# Patient Record
Sex: FEMALE | Race: WHITE | NOT HISPANIC OR LATINO | Employment: UNEMPLOYED | URBAN - METROPOLITAN AREA
[De-identification: names, ages, dates, MRNs, and addresses within clinical notes are randomized per-mention and may not be internally consistent; named-entity substitution may affect disease eponyms.]

---

## 2017-01-30 ENCOUNTER — HISTORICAL (OUTPATIENT)
Dept: INTERNAL MEDICINE | Facility: CLINIC | Age: 42
End: 2017-01-30

## 2017-02-17 ENCOUNTER — HISTORICAL (OUTPATIENT)
Dept: INTERNAL MEDICINE | Facility: CLINIC | Age: 42
End: 2017-02-17

## 2017-09-27 ENCOUNTER — HISTORICAL (OUTPATIENT)
Dept: RADIOLOGY | Facility: HOSPITAL | Age: 42
End: 2017-09-27

## 2018-03-14 ENCOUNTER — HISTORICAL (OUTPATIENT)
Dept: RADIOLOGY | Facility: HOSPITAL | Age: 43
End: 2018-03-14

## 2018-08-28 ENCOUNTER — HISTORICAL (OUTPATIENT)
Dept: INTERNAL MEDICINE | Facility: CLINIC | Age: 43
End: 2018-08-28

## 2018-09-06 ENCOUNTER — HISTORICAL (OUTPATIENT)
Dept: CARDIOLOGY | Facility: HOSPITAL | Age: 43
End: 2018-09-06

## 2018-11-05 ENCOUNTER — HISTORICAL (OUTPATIENT)
Dept: ADMINISTRATIVE | Facility: HOSPITAL | Age: 43
End: 2018-11-05

## 2018-11-09 ENCOUNTER — HISTORICAL (OUTPATIENT)
Dept: INTERNAL MEDICINE | Facility: CLINIC | Age: 43
End: 2018-11-09

## 2018-11-12 LAB — FINAL CULTURE: NORMAL

## 2018-12-14 ENCOUNTER — HISTORICAL (OUTPATIENT)
Dept: ADMINISTRATIVE | Facility: HOSPITAL | Age: 43
End: 2018-12-14

## 2019-02-28 ENCOUNTER — HISTORICAL (OUTPATIENT)
Dept: INTERNAL MEDICINE | Facility: CLINIC | Age: 44
End: 2019-02-28

## 2019-05-09 ENCOUNTER — HISTORICAL (OUTPATIENT)
Dept: ADMINISTRATIVE | Facility: HOSPITAL | Age: 44
End: 2019-05-09

## 2019-05-23 ENCOUNTER — HISTORICAL (OUTPATIENT)
Dept: SURGERY | Facility: HOSPITAL | Age: 44
End: 2019-05-23

## 2019-06-25 ENCOUNTER — HISTORICAL (OUTPATIENT)
Dept: ADMINISTRATIVE | Facility: HOSPITAL | Age: 44
End: 2019-06-25

## 2019-07-01 ENCOUNTER — HISTORICAL (OUTPATIENT)
Dept: RADIOLOGY | Facility: HOSPITAL | Age: 44
End: 2019-07-01

## 2019-07-24 ENCOUNTER — HISTORICAL (OUTPATIENT)
Dept: INTERNAL MEDICINE | Facility: CLINIC | Age: 44
End: 2019-07-24

## 2019-07-30 ENCOUNTER — HISTORICAL (OUTPATIENT)
Dept: SURGERY | Facility: HOSPITAL | Age: 44
End: 2019-07-30

## 2019-10-09 ENCOUNTER — HISTORICAL (OUTPATIENT)
Dept: ADMINISTRATIVE | Facility: HOSPITAL | Age: 44
End: 2019-10-09

## 2019-10-15 ENCOUNTER — HISTORICAL (OUTPATIENT)
Dept: SURGERY | Facility: HOSPITAL | Age: 44
End: 2019-10-15

## 2020-11-30 ENCOUNTER — HISTORICAL (OUTPATIENT)
Dept: ADMINISTRATIVE | Facility: HOSPITAL | Age: 45
End: 2020-11-30

## 2021-01-29 ENCOUNTER — HISTORICAL (OUTPATIENT)
Dept: RADIOLOGY | Facility: HOSPITAL | Age: 46
End: 2021-01-29

## 2021-02-11 ENCOUNTER — HISTORICAL (OUTPATIENT)
Dept: RADIOLOGY | Facility: HOSPITAL | Age: 46
End: 2021-02-11

## 2021-11-11 ENCOUNTER — HISTORICAL (OUTPATIENT)
Dept: ADMINISTRATIVE | Facility: HOSPITAL | Age: 46
End: 2021-11-11

## 2021-11-13 LAB — FINAL CULTURE: NO GROWTH

## 2022-04-07 ENCOUNTER — HISTORICAL (OUTPATIENT)
Dept: ADMINISTRATIVE | Facility: HOSPITAL | Age: 47
End: 2022-04-07
Payer: MEDICAID

## 2022-04-24 VITALS
SYSTOLIC BLOOD PRESSURE: 103 MMHG | HEIGHT: 60 IN | WEIGHT: 114.63 LBS | DIASTOLIC BLOOD PRESSURE: 67 MMHG | BODY MASS INDEX: 22.51 KG/M2 | OXYGEN SATURATION: 100 %

## 2022-04-30 NOTE — H&P
"   Patient:   Claudia Hurtado            MRN: [id number]            FIN: [id number]               Age:   44 years     Sex:  Female     :  1975   Associated Diagnoses:   None   Author:   Surjit Reese MD      No changes as below      Surjit Reese MD      Chief Complaint   referral from NP for bilat. CTS for surgical evaluation History of Present Illness 44 yo female non-smoker presents to ortho clinic for follow up visit for bilateral hand pain. Patient points to diffuse hand numbness. Patient dominate hand: right R>L    EMG dated 16 reviewed:  Abnormal study. Nerve conduction study of bilateral upper extremities show findings of moderate bilateral carpal tunnel syndrome. There was no findings of ulnar entrapment neuropathy at the elbows bilaterally.    Today: Re-evaluation for conservative treatment for bilateral CTS; symptoms continue despite conservative treatment and would like to be considered for carpal tunnel release. She admits wearing the splint at night helps with the pain. Ibuprofen does not help with the pain. States she had CSI without improvement.    Onset: Insidious over years progressively worsening (8 years)  Current pain level: 9/10 without pain medication. Quality described as sharp . Patient denies use of pain medication today.   Medications r/t complaint: Patient reports using RX / OTC medications in past including: Tramadol, ibuprofen 800 mg RX per PCP/ED. Currently taking same PRN pain with mild relief.   Modifying Factors: Worse with activity;Improved with rest; No stiffness   Injury: Denies  Previous treatment: HEP RX 18 and reports does them "sometimes"; RX NSAIDs, PT RX 18 and reports she has not attended , CSI 18 without symptom relief; nocturnal splinting RX per PCP patient reports using PRN.  Associated Symptoms: No Crepitus/Grinding; Numbness/ tingling; No swelling; No skin changes; Weakness of R>L hand; Moderate decrease in ROM; difficulty " sleeping at night s/t pain  Activity: Sedentary, full ADLs; Pain interferes with function/daily activity (mild)  Family History: Family history of arthritis  PMH: denies CVD; denies DM ; denies RA; denies gout; denies RX anticoagulants; denies intolerance to NSAIDs s/t GI issues; denies intolerance to NSAIDs s/t kidney disease  PCP: Monet Boles NP [facility] , referral source same  Employment: Patient was a  for 30 years. Quit 1 year ago. Currently work at the [employer].  Note: Patient in clinic 12/14/18 with frustration over wait to get into clinic, discussed referral and tried to improve rapport to improve patient outcome and satisfaction with treatment. Review of Systems Constitutional:No fever;No chills;No weight loss  CV:No swelling;No edema  GI:No urinary retention;No urinary incontinence  :No fecal incontinence  Skin:No rash;No wound  Neuro:No numbness/tingling;No weakness;No saddle anesthesia  MSK: As above  Psych:No depression;No anxiety  Heme/Lymph:No easy bruising;No easy bleeding;No lymphadenopathy  Immuno:No MRSA history Physical Exam Vitals & Measurements HT: 154 cm WT: 51.5 kg BMI: 21.72 MSK:Bilateral HAND/ WRIST  General: In mild distress; patient is crying saying she is in pain  Inspection: No masses, no deformity, no skin discoloration, no joint swelling, no contracture, no presences of cysts/nodules. No muscle atrophy to the thenar eminence or intrinsic muscles of the first web space. Currently wearing a wrist splint  Palpation: tender to palpate diffuse right wrist; No swelling;No bony enlargement  ROM:   Open/closure of hand: Inability to flex or extend and/or malalignment with motion  Strength:    strength: Reduced R>L  Special Tests:  Trigger finger presence: Negative  Phalen test:Positive R>L  Tinel test: Negative   Eliu carpal compression:Positive R>L  Finkelstein test: Negative  Neurovascular: Intact; 2+ distal pulse, sensation intact to light touch  Neuro/Psych:  Awake, Alert, Oriented; Normal mood and affect  Lymphatic: No LAD  Skin and Soft Tissue: No bruising, No ecchymosis; No rash; Skin intact Assessment/Plan 1. Bilateral carpal tunnel syndrome G56.03 Discussed with patient diagnosis and treatment recommendations. Handout given.  Imaging:EMG reviewed and discussed with patient.  Treatment plan: consult orthopedic surgery for evaluation. Discussed CSI with patient. Patient has failed with CSI and would not like another CSI. States she would like to be consider for CTS surgery instead. Discuss with Dr. Reese, orthopedic resident and agrees to proceed with right CTS surgery. Patient scheduled for RIGHT CTS surgery on 05/16/2019 with Dr Reese and   Procedure: none  Activity: Activity as tolerated; activity modifications as necessary  Therapy:Nocturnal splinting of bilateral_ wrist in neutral position   Medication: continue medications as RX per PCP; continue medications as RX per PCP  RTC: as needed  Additional work-up:

## 2022-04-30 NOTE — OP NOTE
Patient:   Claudia Hurtado            MRN: [id number]            FIN: [id number]               Age:   44 years     Sex:  Female     :  1975   Associated Diagnoses:   None   Author:   Surjit Reese MD      DOS: 19    Surgery: R carpal tunnel release    Preop Dx: R carpal tunnel syndrome    Postop Dx: same as preop    Surgeon: Dr. Reese    Attending: Dr. Flowers    EBL: minimal    Complications: none    Implants: none    Technique:    The patient is a 44F with carpal. tunnel syndrome.  R/B of surgery were discussed, patient signed informed consent.  Seen in preop and site was marked.  Taken to OR and given periop abx as appropriate.  Tolerate anesthesia induction well.  RUE prepped and draped in the usual sterile fashion.  Timeout performed.  10cc of 1% lidocaine plain used to perform a field block.  Limb exsanguinated and tourniquet inflated to 250mmHg.      A 15 blade was used to perform a 2cm incision longitudinally in line with the radial aspect of the 4th ray.  Dissection was carried through subcutaneous fat, palmar aponeurosis identifying the TCL.  The transverse carpal ligament was incised witha  15 blade and fully released with metzenbaum scissors.  A freer was used to confirm full release.  The wound was thoroughly irrigated and closed with 3-0 nylon.  A soft dressing was placed and the patient was awakened from anesthesia/tourniquet let down with the patient having suffered no untoward events from the procedure.     Postop Plan    Sutures out at 2 weeks      Surjit Reese MD

## 2022-04-30 NOTE — OP NOTE
Patient:   Claudia Hurtado            MRN: [id number]            FIN: [id number]               Age:   44 years     Sex:  Female     :  1975   Associated Diagnoses:   None   Author:   Paulo Bacon MD      Operative Note   Operative Information   Date/ Time:  10/15/2019 09:20:00.     Procedures Performed: Left carpal tunnel release.     Indications: Patient is a 44-year-old female with severe left carpal tunnel syndrome confirmed on EMG.  Patient indicated for open carpal tunnel release after having failed nonoperative and conservative treatments..     Preoperative Diagnosis: Left carpal tunnel syndrome.     Postoperative Diagnosis: Left carpal tunnel syndrome.     Surgeon: Kendall Crockett MD.     Assistant: Paulo Bacon MD.     Anesthesia: MAC with local.     Esimated blood loss: loss  5  cc.     Findings: Severely thickened transverse carpal ligament.     Complications: None.     Notes: Patient was seen and examined in the preoperative holding area.  The correct site was marked history and physical updated consents verified patient was taken to the operating room placed in supine position left upper humerus prepped in the usual sterile fashion after MAC anesthesia was undertaken.  Before draping, a 10 mL block with half percent lidocaine and 1% lidocaine was injected into the operative site and into the carpal tunnel.  A preoperative timeout was held with all members of surgical team.  A nonsterile tourniquet was previously placed as well.  Preoperative antibiotics were administered.  The operative extremity was exsanguinated and the tourniquet was taken up.  A longitudinal standard carpal tunnel incision was made.  Sharp dissection was taken down to the palmar fascia which was incised.  The transverse carpal ligament was identified and cleared soft tissue.  The ligament was then incised with a knife.  Scissors were used to fully release the tendon both proximally and distally.  Happy with the  release, the wound was then thoroughly irrigated.  The skin was then closed with interrupted Prolene stitches.  A soft dressing was placed.  The tourniquet was taken down.  All needle and sponge counts were correct.  Concluded the operation.  The patient was awakened and taken to the recovery room having suffered no untoward event.    Postop plan  Stitches of 2 weeks  Nonweightbearing for 2 weeks  5 pound weight limit restriction for 6.

## 2022-04-30 NOTE — H&P
Patient:   Claudia Hurtado            MRN: [id number]            FIN: [id number]               Age:   44 years     Sex:  Female     :  1975   Associated Diagnoses:   None   Author:   Paulo Bacon MD      Chief Complaint   right CTR Right thumb pain   History of Present Illness Claudia Hurtado is a RHD 44 Years Female presenting as a established patient to [facility] for follow-up status post right carpal tunnel release. Patient reports that initially after surgery she was symptom-free in approximately 1 month ago she began having a burning sensation on the thenar aspect of her right thumb. She denies any trauma to the area. She denies any radiation of the pain. She reports that the burning sensation is worse when she is using her thumb. She has has not tried anything to help relieve her pain at this point. Otherwise, her remaining numbness and tingling is still gone from her right hand.    Patient also presenting with worsening carpal tunnel syndromes in her left hand. Patient does have an EMG demonstrating moderate carpal tunnel syndrome on the left. She has attempted night splints and other conservative treatment which now has failed. She is reporting numbness and tingling in her in her long middle and ring finger. She also reports that she is dropping things and noticing decreased  strength in her hand. She reports the pain is worse at night.    Review of Systems negative except as noted in HPI Physical Exam   Vitals & Measurements HT: 153 cm WT: 49 kg BMI: 20.93 GEN: NAD, cooperative with normal mood  CV: RRR by palpation  RESP: normal work of breathing  RUE:  previous surgical incision healed  negative grind  IP joint stable to varus/valgus stress  SILT M/U/R  +ain/pin/ulnar  2+ RP    LUE:  no atrophy of thenar or hypothenar hand muscles  +ain/pin/ulnar  SILT M/U/R with decreased 2 point discrimination at ring, long, and index finger  2+ RP    Assessment/Plan 1. Carpal tunnel  syndrome on left G56.02 With regard to the patient's symptoms on her right hand, I encouraged her to attempt conservative treatment. She will wear her night splint at night and additionally can take Neurontin at night to help with the nerve symptoms.    With regard to her left carpal tunnel syndrome, she has failed conservative treatment. Discussed with her the benefits and risks of carpal tunnel release. Specifically discussed with her that the goal is to help prevent any further atrophy of her muscles. Patient understands that she might still have some sensation of tingling at her distal fingertips. Sx for left carpal tunnel release on October 15 Medications Claritin 10 mg oral tablet, 10 mg= 1 tab(s), Oral, Daily Flonase 50 mcg/inh nasal spray, 2 spray(s), Nasal, BID omeprazole 20 mg oral DR capsule, 20 mg= 1 cap(s), Oral, Daily, 11 refills   Diagnostic Results EMG demonstrates moderate left carpal tunnel syndrome with no entrapment of ulnar nerve            with  at bedside, pt alert and awake x3, states she started to have chest pain since 0900, denies any cardiac hx, tender upon palpation, LS clear, resp even and unlabored bilat, abd soft, denies n/v/d, denies fever/chills, will continue to monitor.

## 2022-05-01 NOTE — HISTORICAL [facility] CERNER
This is a historical note converted from [employer]. Formatting and pictures may have been removed.  Please reference [employer] for original formatting and attached multimedia. Indication for Surgery  44-year-old female with bilateral chronic serous effusions, and type B temps  Preoperative Diagnosis  Bilateral chronic serous effusions  Postoperative Diagnosis  Same  Operation  1. ?Bilateral myringotomy and tubes  Surgeon(s)  Rylie Charlton MD  Anesthesia  General Mask Anesthesia  Estimated Blood Loss  0 cc  Findings  Bilateral thick mucoid effusions  Specimen(s)  None  Complications  None  Technique  Risks, benefits, and alternatives of the procedure were explained to the patient and consent was obtained. The patient was then taken to the operating room and placed on the table in supine position. General mask anesthesia was induced and the patient was positioned.  ?   The right ear was examined first using the operating microscope. A speculum was placed into the ear canal which was cleaned using a curette and suction. A myringotomy knife was then used to make an?incision in the anterior-inferior portion of the tympanic membrane. ?Thick mucoserous?effusion was suctioned from behind the drum. An Ching tube was placed through the incision.? Ofloxacin drops were the placed and the speculum was removed. The patients head was turned and the same procedure was repeated on the left side with thick mucoserous?effusion present and suctioned.  ?   The procedure was then ended and the patient was returned to anesthesia to be awakened. The was awaked and taken to the recovery room in stable condition.  ?   Dr. James supervised the entire procedure.  ?  Rylie Charlton MD  [employer] [facility] PGY2   I was present for the key portions of the surgery and actively participated in the patient?s care. ?I agree with the operative note as dictated by the resident.  ?
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
## 2022-05-02 NOTE — HISTORICAL [facility] CERNER
This is a historical note converted from Certaty. Formatting and pictures may have been removed.  Please reference Certaty for original formatting and attached multimedia. Chief Complaint  Annual  History of Present Illness  Pt is  here for annual GYN exam. Pt reports a hx of breast cancer with lumpectomy and chemo/radiation at age 25. Pt also reports colon cancer with surgery at 28 and 33. Reports?skin cancer. Pt has had a hysterectomy with BSO in  in [place], d/t ovarian cancer.?Last vag pap-2019-NIL and HPV neg. MG-21-BIRADS 1. Denies any breast complaints.?Pt reports?urinary?frequency and frequent UTIs.?States?I dont have symptoms when I have a UTI.?Admits to not drinking much water and high coffee and soda usage. She was referred to uro/gyn last year however not seen. Denies pelvic pain.?Treated for trichomonas last year, she is interested in STD screening today. Positive tobacco use. Admits to breast cancer in mother @ 52 and ovarian cancer in 2 sisters.?Pt completed genetic testing in 2017?showing only?variant of uncertain significance (VUS): HEIKE p.[id number]. Pt is followed by IM for primary?care.  Review of Systems  CONSTITUTIONAL:??No weight loss, fever, chills or fatigue.  BREAST: No lumps or masses or pain, no nipple discharge or inversion  GI:??No nausea, vomiting or?abdominal pain.  :??No dysuria or urgency.?No hematuria. c/o frequency  GYN: No abnormal discharge, itching, bleeding, pelvic pain.  NEUROLOGIC:??Alert and oriented, No confusion.  Physical Exam  Vitals & Measurements  T:?36.3? ?C (Oral)? HR:?66(Peripheral)? RR:?16? BP:?115/70? SpO2:?100%?  HT:?152.00?cm? WT:?52.000?kg? BMI:?22.51?  GENERAL: Alert and oriented x3. No apparent distress.  BREAST: No mass, tenderness or discharge.?  ABDOMEN: Soft, nontender.?  PELVIC:?  Labia: No erythema or lesions.  Vagina: atrophic, Vaginal cuff intact.  Cervix:?Surgically absent.  Uterus: Surgically absent.  Adnexa: Non-tender, no fullness  noted. Ovaries surgically absent.  INTEGUMENTARY: Warm and dry.  NEUROLOGIC: She is alert and oriented x3.?  PSYCHIATRIC: Cooperative, appropriate mood and affect.  Assessment/Plan  1.?Encounter for routine pelvic examination?Z01.419  ?Pelvic today. Pap deferred d/t hysterectomy  RTC 1 year.  Ordered:  1160F- Medication reconciliation completed during visit, Encounter for routine pelvic examination  Urinary frequency  Routine screening for STI (sexually transmitted infection)  Tobacco user, 11/11/21 10:11:00 CST  Clinic Follow up, *Est. 11/11/22 3:00:00 CST, Order for future visit, Encounter for routine pelvic examination, [facility]  Office/Outpatient Visit Level 4 Established [id number] PC, Encounter for routine pelvic examination  Routine screening for STI (sexually transmitted infection)  Urinary frequency  Tobacco user, 11/11/21 10:12:00 CST  ?  2.?Routine screening for STI (sexually transmitted infection)?Z11.3  ?wet prep and g/c  hiv, hep and syphilis  Ordered:  1160F- Medication reconciliation completed during visit, Encounter for routine pelvic examination  Urinary frequency  Routine screening for STI (sexually transmitted infection)  Tobacco user, 11/11/21 10:11:00 CST  Chlam trachom & N gonorrhoeae by LYDIA-[facility] [id number], Routine collect, Urine, Order for future visit, Collected, 11/11/21 10:11:00 CST, Stop date 11/11/21 10:11:00 CST, Nurse collect, Routine screening for STI (sexually transmitted infection), 11/11/21 10:11:00 CST  Hepatitis Panel-, Routine collect, 11/11/21 10:11:00 CST, Blood, Order for future visit, Stop date 11/11/21 10:11:00 CST, Lab Collect, Routine screening for STI (sexually transmitted infection), 11/11/21 10:11:00 CST  HIV 1 and 2, Routine collect, 11/11/21 10:11:00 CST, Blood, Order for future visit, Stop date 11/11/21 10:11:00 CST, Lab Collect, Routine screening for STI (sexually transmitted infection), 11/11/21 10:11:00 CST  Office/Outpatient Visit Level 4  Established [id number] PC, Encounter for routine pelvic examination  Routine screening for STI (sexually transmitted infection)  Urinary frequency  Tobacco user, 11/11/21 10:12:00 CST  Syphilis Antibody (with Reflex RPR), Routine collect, 11/11/21 10:11:00 CST, Blood, Order for future visit, Stop date 11/11/21 10:11:00 CST, Lab Collect, Routine screening for STI (sexually transmitted infection), 11/11/21 10:11:00 CST  Wet Prep Smear, Routine collect, Vaginal, Order for future visit, 11/11/21 10:11:00 CST, Stop date 11/11/21 10:11:00 CST, Nurse collect, Routine screening for STI (sexually transmitted infection), 11/11/21 10:11:00 CST  ?  3.?Urinary frequency?R35.0  ?c/o frequency and frequent UTIs,  Admit tobacco?and high caffeine use, encouraged decrease use and increase water intake, U/A and C&S sent.  Discussed risk factors of incontinence-age, obesity and smoking. Encouraged lifestyle modifications such as dec alcoholic, carbonated and caffeinated drinks and smoking cessation. Educated on and encouraged pt to perform kegel exercises starting at 10 contractions and 10 relaxations 4 x/day. Urology consult.  Ordered:  1160F- Medication reconciliation completed during visit, Encounter for routine pelvic examination  Urinary frequency  Routine screening for STI (sexually transmitted infection)  Tobacco user, 11/11/21 10:11:00 CST  Office/Outpatient Visit Level 4 Established [id number] PC, Encounter for routine pelvic examination  Routine screening for STI (sexually transmitted infection)  Urinary frequency  Tobacco user, 11/11/21 10:12:00 CST  Urinalysis with Microscopic if Indicated, Routine collect, Urine, Order for future visit, 11/11/21 10:11:00 CST, Stop date 11/11/21 10:11:00 CST, Nurse collect, Urinary frequency  Urine Culture [id number], Routine collect, 11/11/21 10:11:00 CST, Order for future visit, Urine, Clean Catch, Nurse collect, Stop date 11/11/21 10:11:00 CST, Urinary frequency  Urine Dipstick POC, 11/11/21  10:11:00 CST, Stop date 11/11/21 10:11:00 CST, 11/11/21 10:11:00 CST  ?  4.?Tobacco user?Z72.0  ?Smoking cessation counseling provided.?Instructed on smoking cessation program through [facility] and pharmacological interventions to aid in cessation.  Ordered:  1160F- Medication reconciliation completed during visit, Encounter for routine pelvic examination  Urinary frequency  Routine screening for STI (sexually transmitted infection)  Tobacco user, 11/11/21 10:11:00 CST  Office/Outpatient Visit Level 4 Established [id number] PC, Encounter for routine pelvic examination  Routine screening for STI (sexually transmitted infection)  Urinary frequency  Tobacco user, 11/11/21 10:12:00 CST  ?  Referrals  Clinic Follow up, *Est. 11/11/22 3:00:00 CST, Order for future visit, Encounter for routine pelvic examination, [facility]   Problem List/Past Medical History  Ongoing  Bilateral carpal tunnel syndrome  Breast cancer  Cancer of colon  Cancer of skin  Chronic otitis media  Constipation  Hematoma  Laceration of right knee  Ovarian cancer  Tobacco user  Tobacco user  Historical  Cancer  Pregnant  Pregnant  Pregnant  Pregnant  Procedure/Surgical History  Carpal Tunnel Release (None) (10/15/2019)  Neuroplasty and/or transposition; median nerve at carpal tunnel (10/15/2019)  Release Median Nerve, Open Approach (10/15/2019)  Drainage of Left Middle Ear with Drainage Device, Via Natural or Artificial Opening (07/30/2019)  Drainage of Right Middle Ear with Drainage Device, Via Natural or Artificial Opening (07/30/2019)  Myringotomy With Tubes (None) (07/30/2019)  Tympanostomy (requiring insertion of ventilating tube), general anesthesia (07/30/2019)  Carpal Tunnel Release (None) (05/23/2019)  Neuroplasty and/or transposition; median nerve at carpal tunnel (05/23/2019)  Release Median Nerve, Open Approach (05/23/2019)  Repair Right Lower Leg Subcutaneous Tissue and Fascia, Open Approach (07/21/2017)  Simple repair of superficial  wounds of scalp, neck, axillae, external genitalia, trunk and/or extremities (including hands and feet); 7.6 cm to 12.5 cm (07/21/2017)  Drainage of Bladder with Drainage Device, Via Natural or Artificial Opening (01/30/2017)  Insertion of temporary indwelling bladder catheter; simple (eg, Watkins) (01/30/2017)  Colonoscopy, flexible; diagnostic, including collection of specimen(s) by brushing or washing, when performed (separate procedure) (08/03/2016)  Esophagogastroduodenoscopy, flexible, transoral; diagnostic, including collection of specimen(s) by brushing or washing, when performed (separate procedure) (08/03/2016)  Inspection of Lower Intestinal Tract, Via Natural or Artificial Opening Endoscopic (08/03/2016)  Inspection of Upper Intestinal Tract, Via Natural or Artificial Opening Endoscopic (08/03/2016)  hetoma removal (2011)  Hysterectomy (01/01/2001)  breast tumor removal  knee surgey  Tonsillectomy and adenoidectomy; age 12 or over   Medications  albuterol CFC free 90 mcg/inh inhalation aerosol with adapter, 2 puff(s), Oral, q4hr  amoxicillin-clavulanate 875 mg-125 mg oral tablet, 1 tab(s), Oral, BID  fluticasone 50 mcg/inh nasal spray, 2 spray(s), Both Nostrils, Daily  hydrocortisone/neomycin/polymyxin B 1%-0.35%-10,000 units/mL otic solution, 2 drop(s), Ear-Left, QID, 1 refills  naproxen 500 mg oral tablet, 500 mg= 1 tab(s), Oral, BID  prednisONE 20 mg oral tablet, 40 mg= 2 tab(s), Oral, Daily  Allergies  No Known Allergies  Social History  Abuse/Neglect  No, No, Yes, 01/19/2021  No, No, Yes, 01/14/2021  Alcohol  Current, Beer, 1-2 times per year, Alcohol use interferes with work or home: No. Others hurt by drinking: No. Household alcohol concerns: No., 01/19/2021  Current, Liquor, 1-2 times per year, 12/30/2020  Employment/School  Employed, Work/School description: ., 10/19/2016  Exercise  Exercise frequency: 3-4 times/week., 09/17/2015  Home/Environment  Lives with Significant other.,  08/08/2017    Never in , 01/19/2021  Nutrition/Health  Regular, Wants to lose weight: No. Sleeping concerns: Yes. Feels highly stressed: Yes., 09/17/2015  Sexual  Sexually active: Yes., 11/10/2020  Spiritual/Cultural  [employer]ehov[employer], 01/19/2021  Substance Use  Never, 01/19/2021  Past, 12/30/2020  Tobacco  5-9 cigarettes (between 1/4 to 1/2 pack)/day in last 30 days, Cigarettes, No, 01/19/2021  5-9 cigarettes (between 1/4 to 1/2 pack)/day in last 30 days, Cigarettes, No, 01/14/2021  Family History  Breast cancer: Mother.  Cancer: Brother.  Depression.: Sister and Brother.  Diabetes: Father.  Hypertension.: Father.  Ovarian cancer: Sister.  Immunizations  Vaccine Date Status   influenza virus vaccine, inactivated 11/06/2019 Given   influenza virus vaccine, inactivated 11/05/2018 Given   tetanus-diphtheria toxoids 07/21/2017 Given   Health Maintenance  Health Maintenance  ???Pending?(in the next year)  ??? ??OverDue  ??? ? ? ?Smoking Cessation due??01/01/21??and every 1??year(s)  ??? ? ? ?Alcohol Misuse Screening due??01/02/21??and every 1??year(s)  ??? ??Due In Future?  ??? ? ? ?Obesity Screening not due until??01/01/22??and every 1??year(s)  ??? ? ? ?ADL Screening not due until??01/19/22??and every 1??year(s)  ??? ? ? ?Diabetes Screening not due until??05/08/22??and every 3??year(s)  ???Satisfied?(in the past 1 year)  ??? ??Satisfied?  ??? ? ? ?ADL Screening on??01/19/21.??Satisfied by Naomie Rodgers LPN  ??? ? ? ?Blood Pressure Screening on??11/11/21.??Satisfied by Tawana Melissa KENNEDY  ??? ? ? ?Body Mass Index Check on??11/11/21.??Satisfied by Tawana Melissa KENNEDY  ??? ? ? ?Breast Cancer Screening on??01/29/21.??Satisfied by Rachel Spann  ??? ? ? ?Depression Screening on??01/19/21.??Satisfied by Naomie Rodgers LPN  ??? ? ? ?Influenza Vaccine on??11/11/21.??Satisfied by Tawana Melissa KENNEDY  ??? ? ? ?Obesity Screening on??11/11/21.??Satisfied by Tawana Melissa KENNEDY  ?

## 2022-06-14 NOTE — PROGRESS NOTES
Chief Complaint: No chief complaint on file.      HPI: Patient is a 47-year-old female  Two-month follow-up for neurogenic bladder.   Patient placed on oxybutynin ER 5 mg patient's symptoms  Urinary urgency andincontinence continue.  Patient denies  urinary frequency, retention, dysuria, gross hematuria.  Patient very compliant with decreasing coffee intake and sodas is down to 1 coffee a day and 2 sodas a day. Bladder scan 81 ml.    Plan:  Instructed patient to increase oxybutynin ER to 10 mg p.o. daily discontinue oxybutynin 5 mg follow-up in 3  months. the patient the technique of double voiding and timed voiding every 2 -3 hours.Instructed patient on Avoiding bladder irritants, such as alcohol, citrus drinks or foods,salty foods, energy drinks, spicy foods, caffeine, sodas.  Allergies:  Review of patient's allergies indicates:  Not on File    Medications:  No current outpatient medications on file.     No current facility-administered medications for this visit.       Review of Systems:  General: No fever, chills, fatigability, or weight loss.  Skin: No rashes, itching, or changes in color or texture of skin.  Chest: Denies RALPH, cyanosis, wheezing, cough, and sputum production.  Abdomen: Appetite fine. No weight loss. Denies diarrhea, abdominal pain, hematemesis, or blood in stool.  Musculoskeletal: No joint stiffness or swelling. Denies back pain.  : As above.  All other review of systems negative.    PMH:  No past medical history on file.    PSH:  No past surgical history on file.    FamHx:  No family history on file.    SocHx:  Social History     Socioeconomic History    Marital status: Single       Physical Exam:  There were no vitals filed for this visit.  General: A&Ox3, no apparent distress, no deformities  Neck: No masses, normal thyroid  Lungs: CTA ita, no use of accessory muscles  Heart: RRR, no arrhythmias  Abdomen: Soft, NT, ND, no masses, no hernias, no hepatosplenomegaly  Lymphatic: Neck and  groin nodes negative  Skin: The skin is warm and dry. No jaundice.  Ext: No c/c/e.    Labs: none      Imaging: none       Impression:    Neurogenic Bladder, Urinary urgency.    Plan: Instructed patient to increase oxybutynin ER to 10 mg p.o. daily discontinue oxybutynin 5 mg follow-up in 3  months. the patient the technique of double voiding and timed voiding every 2 through hours.Instructed patient on Avoiding bladder irritants, such as alcohol, citrus drinks or foods,salty foods, energy drinks, spicy foods, caffeine, sodas.

## 2022-06-15 ENCOUNTER — OFFICE VISIT (OUTPATIENT)
Dept: UROLOGY | Facility: CLINIC | Age: 47
End: 2022-06-15
Payer: MEDICAID

## 2022-06-15 VITALS
OXYGEN SATURATION: 100 % | SYSTOLIC BLOOD PRESSURE: 111 MMHG | DIASTOLIC BLOOD PRESSURE: 76 MMHG | RESPIRATION RATE: 18 BRPM | TEMPERATURE: 98 F | WEIGHT: 99.38 LBS | HEIGHT: 60 IN | HEART RATE: 63 BPM | BODY MASS INDEX: 19.51 KG/M2

## 2022-06-15 DIAGNOSIS — N39.490 OVERFLOW INCONTINENCE OF URINE: ICD-10-CM

## 2022-06-15 DIAGNOSIS — N31.9 NEUROGENIC BLADDER: ICD-10-CM

## 2022-06-15 PROCEDURE — 1159F PR MEDICATION LIST DOCUMENTED IN MEDICAL RECORD: ICD-10-PCS | Mod: CPTII,,, | Performed by: NURSE PRACTITIONER

## 2022-06-15 PROCEDURE — 1160F PR REVIEW ALL MEDS BY PRESCRIBER/CLIN PHARMACIST DOCUMENTED: ICD-10-PCS | Mod: CPTII,,, | Performed by: NURSE PRACTITIONER

## 2022-06-15 PROCEDURE — 3074F SYST BP LT 130 MM HG: CPT | Mod: CPTII,,, | Performed by: NURSE PRACTITIONER

## 2022-06-15 PROCEDURE — 3078F DIAST BP <80 MM HG: CPT | Mod: CPTII,,, | Performed by: NURSE PRACTITIONER

## 2022-06-15 PROCEDURE — 99214 OFFICE O/P EST MOD 30 MIN: CPT | Mod: PBBFAC | Performed by: NURSE PRACTITIONER

## 2022-06-15 PROCEDURE — 3074F PR MOST RECENT SYSTOLIC BLOOD PRESSURE < 130 MM HG: ICD-10-PCS | Mod: CPTII,,, | Performed by: NURSE PRACTITIONER

## 2022-06-15 PROCEDURE — 99213 PR OFFICE/OUTPT VISIT, EST, LEVL III, 20-29 MIN: ICD-10-PCS | Mod: S$PBB,,, | Performed by: NURSE PRACTITIONER

## 2022-06-15 PROCEDURE — 3008F PR BODY MASS INDEX (BMI) DOCUMENTED: ICD-10-PCS | Mod: CPTII,,, | Performed by: NURSE PRACTITIONER

## 2022-06-15 PROCEDURE — 1160F RVW MEDS BY RX/DR IN RCRD: CPT | Mod: CPTII,,, | Performed by: NURSE PRACTITIONER

## 2022-06-15 PROCEDURE — 1159F MED LIST DOCD IN RCRD: CPT | Mod: CPTII,,, | Performed by: NURSE PRACTITIONER

## 2022-06-15 PROCEDURE — 3008F BODY MASS INDEX DOCD: CPT | Mod: CPTII,,, | Performed by: NURSE PRACTITIONER

## 2022-06-15 PROCEDURE — 99213 OFFICE O/P EST LOW 20 MIN: CPT | Mod: S$PBB,,, | Performed by: NURSE PRACTITIONER

## 2022-06-15 PROCEDURE — 3078F PR MOST RECENT DIASTOLIC BLOOD PRESSURE < 80 MM HG: ICD-10-PCS | Mod: CPTII,,, | Performed by: NURSE PRACTITIONER

## 2022-06-15 RX ORDER — OXYBUTYNIN CHLORIDE 10 MG/1
10 TABLET, EXTENDED RELEASE ORAL DAILY
Qty: 30 TABLET | Refills: 11 | Status: SHIPPED | OUTPATIENT
Start: 2022-06-15 | End: 2023-06-15

## 2022-06-15 RX ORDER — ALBUTEROL SULFATE 90 UG/1
2 AEROSOL, METERED RESPIRATORY (INHALATION) EVERY 4 HOURS PRN
COMMUNITY
Start: 2021-08-05

## 2022-06-15 NOTE — PROGRESS NOTES
Patient seen by MATILDA Ward. Bladder scan performed with 81mL post void residual. Sending prescription to patient pharmacy and RTC 3 months. Discharge instructions given verbal and written.

## 2022-07-11 ENCOUNTER — OFFICE VISIT (OUTPATIENT)
Dept: OTOLARYNGOLOGY | Facility: CLINIC | Age: 47
End: 2022-07-11
Payer: MEDICAID

## 2022-07-11 VITALS
WEIGHT: 108 LBS | HEART RATE: 76 BPM | HEIGHT: 60 IN | BODY MASS INDEX: 21.2 KG/M2 | TEMPERATURE: 98 F | DIASTOLIC BLOOD PRESSURE: 60 MMHG | SYSTOLIC BLOOD PRESSURE: 100 MMHG

## 2022-07-11 DIAGNOSIS — J30.9 ALLERGIC RHINITIS, UNSPECIFIED SEASONALITY, UNSPECIFIED TRIGGER: ICD-10-CM

## 2022-07-11 DIAGNOSIS — H91.90 HEARING LOSS, UNSPECIFIED HEARING LOSS TYPE, UNSPECIFIED LATERALITY: Primary | ICD-10-CM

## 2022-07-11 DIAGNOSIS — M79.18 MYOFASCIAL PAIN: ICD-10-CM

## 2022-07-11 DIAGNOSIS — M26.623 BILATERAL TEMPOROMANDIBULAR JOINT PAIN: ICD-10-CM

## 2022-07-11 PROCEDURE — 3074F PR MOST RECENT SYSTOLIC BLOOD PRESSURE < 130 MM HG: ICD-10-PCS | Mod: CPTII,,, | Performed by: NURSE PRACTITIONER

## 2022-07-11 PROCEDURE — 3008F BODY MASS INDEX DOCD: CPT | Mod: CPTII,,, | Performed by: NURSE PRACTITIONER

## 2022-07-11 PROCEDURE — 3078F DIAST BP <80 MM HG: CPT | Mod: CPTII,,, | Performed by: NURSE PRACTITIONER

## 2022-07-11 PROCEDURE — 1159F MED LIST DOCD IN RCRD: CPT | Mod: CPTII,,, | Performed by: NURSE PRACTITIONER

## 2022-07-11 PROCEDURE — 3074F SYST BP LT 130 MM HG: CPT | Mod: CPTII,,, | Performed by: NURSE PRACTITIONER

## 2022-07-11 PROCEDURE — 99214 OFFICE O/P EST MOD 30 MIN: CPT | Mod: PBBFAC | Performed by: NURSE PRACTITIONER

## 2022-07-11 PROCEDURE — 99214 OFFICE O/P EST MOD 30 MIN: CPT | Mod: S$PBB,,, | Performed by: NURSE PRACTITIONER

## 2022-07-11 PROCEDURE — 99214 PR OFFICE/OUTPT VISIT, EST, LEVL IV, 30-39 MIN: ICD-10-PCS | Mod: S$PBB,,, | Performed by: NURSE PRACTITIONER

## 2022-07-11 PROCEDURE — 3008F PR BODY MASS INDEX (BMI) DOCUMENTED: ICD-10-PCS | Mod: CPTII,,, | Performed by: NURSE PRACTITIONER

## 2022-07-11 PROCEDURE — 1159F PR MEDICATION LIST DOCUMENTED IN MEDICAL RECORD: ICD-10-PCS | Mod: CPTII,,, | Performed by: NURSE PRACTITIONER

## 2022-07-11 PROCEDURE — 3078F PR MOST RECENT DIASTOLIC BLOOD PRESSURE < 80 MM HG: ICD-10-PCS | Mod: CPTII,,, | Performed by: NURSE PRACTITIONER

## 2022-07-11 RX ORDER — TRIAMCINOLONE ACETONIDE 55 UG/1
2 SPRAY, METERED NASAL DAILY
Qty: 16.9 ML | Refills: 6 | Status: SHIPPED | OUTPATIENT
Start: 2022-07-11

## 2022-07-11 RX ORDER — LEVOCETIRIZINE DIHYDROCHLORIDE 5 MG/1
5 TABLET, FILM COATED ORAL NIGHTLY
Qty: 30 TABLET | Refills: 11 | Status: SHIPPED | OUTPATIENT
Start: 2022-07-11 | End: 2023-07-11
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
## 2022-08-22 ENCOUNTER — OFFICE VISIT (OUTPATIENT)
Dept: OTOLARYNGOLOGY | Facility: CLINIC | Age: 47
End: 2022-08-22
Payer: MEDICAID

## 2022-08-22 VITALS
SYSTOLIC BLOOD PRESSURE: 129 MMHG | RESPIRATION RATE: 16 BRPM | TEMPERATURE: 98 F | DIASTOLIC BLOOD PRESSURE: 83 MMHG | HEART RATE: 64 BPM | WEIGHT: 104 LBS | HEIGHT: 60 IN | BODY MASS INDEX: 20.42 KG/M2

## 2022-08-22 DIAGNOSIS — J30.9 ALLERGIC RHINITIS, UNSPECIFIED SEASONALITY, UNSPECIFIED TRIGGER: Primary | ICD-10-CM

## 2022-08-22 PROCEDURE — 99213 OFFICE O/P EST LOW 20 MIN: CPT | Mod: S$PBB,,, | Performed by: NURSE PRACTITIONER

## 2022-08-22 PROCEDURE — 1159F MED LIST DOCD IN RCRD: CPT | Mod: CPTII,,, | Performed by: NURSE PRACTITIONER

## 2022-08-22 PROCEDURE — 3074F SYST BP LT 130 MM HG: CPT | Mod: CPTII,,, | Performed by: NURSE PRACTITIONER

## 2022-08-22 PROCEDURE — 99213 OFFICE O/P EST LOW 20 MIN: CPT | Mod: PBBFAC | Performed by: NURSE PRACTITIONER

## 2022-08-22 PROCEDURE — 3008F PR BODY MASS INDEX (BMI) DOCUMENTED: ICD-10-PCS | Mod: CPTII,,, | Performed by: NURSE PRACTITIONER

## 2022-08-22 PROCEDURE — 3074F PR MOST RECENT SYSTOLIC BLOOD PRESSURE < 130 MM HG: ICD-10-PCS | Mod: CPTII,,, | Performed by: NURSE PRACTITIONER

## 2022-08-22 PROCEDURE — 3079F DIAST BP 80-89 MM HG: CPT | Mod: CPTII,,, | Performed by: NURSE PRACTITIONER

## 2022-08-22 PROCEDURE — 1159F PR MEDICATION LIST DOCUMENTED IN MEDICAL RECORD: ICD-10-PCS | Mod: CPTII,,, | Performed by: NURSE PRACTITIONER

## 2022-08-22 PROCEDURE — 3008F BODY MASS INDEX DOCD: CPT | Mod: CPTII,,, | Performed by: NURSE PRACTITIONER

## 2022-08-22 PROCEDURE — 3079F PR MOST RECENT DIASTOLIC BLOOD PRESSURE 80-89 MM HG: ICD-10-PCS | Mod: CPTII,,, | Performed by: NURSE PRACTITIONER

## 2022-08-22 PROCEDURE — 99213 PR OFFICE/OUTPT VISIT, EST, LEVL III, 20-29 MIN: ICD-10-PCS | Mod: S$PBB,,, | Performed by: NURSE PRACTITIONER
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
## 2022-09-01 ENCOUNTER — CLINICAL SUPPORT (OUTPATIENT)
Dept: AUDIOLOGY | Facility: HOSPITAL | Age: 47
End: 2022-09-01
Payer: MEDICAID

## 2022-09-01 DIAGNOSIS — H91.90 HEARING LOSS, UNSPECIFIED HEARING LOSS TYPE, UNSPECIFIED LATERALITY: ICD-10-CM

## 2022-09-01 DIAGNOSIS — H90.6 MIXED CONDUCTIVE AND SENSORINEURAL HEARING LOSS OF BOTH EARS: Primary | ICD-10-CM

## 2022-09-01 PROCEDURE — 92567 TYMPANOMETRY: CPT | Performed by: AUDIOLOGIST-HEARING AID FITTER

## 2022-09-01 PROCEDURE — 92557 COMPREHENSIVE HEARING TEST: CPT | Performed by: AUDIOLOGIST-HEARING AID FITTER

## 2022-09-01 NOTE — PROGRESS NOTES
Patient History:    Patient evaluated today to assess hearing.  She reportedly perceives significant difficulties with hearing following insertion of PETs.   Tinnitus, otorrhea and dizziness have been denied at this time, however, patient does endorse constant otalgia, bilaterally.  Patient's medical history has reportedly not changed since most recent medical evaluation.       Pure Tone Testing:    Right ear:       Low-frequency moderate, mixed HL (rising to slight HL at 6000 Hz)    Left ear:          Low-frequency moderate, mixed HL (rising to slight HL at 6000 Hz)        Tympanometry:      Right ear:   Type 'B' tympanogram (3.83 mL)    Left ear: Type 'B' tympanogram (2.67 mL)          Acoustic Reflex Testing    Right ear:   Did not test     Left ear: Did not test        Interpretations:    Pure tone testing revealed low frequency, moderate, mixed hearing loss, bilaterally. Speech reception thresholds were obtained at 40 dB HL consistent with pure tone results, bilaterally.  Word recognition scores were excellent, bilaterally.  Immittance testing revealed Type B tympanograms with large EAC volume, bilaterally, indicative of patent PETs. Otoscopy revealed clear EACs and PETs visualized within TMs, bilaterally.    Recommendations:     Audiological testing annually or per ENT  ENT evaluation per ENT  Hearing protection when exposed to hazardous noise    Delta Chirinos.  Clinical Audiologist

## 2022-09-30 ENCOUNTER — OFFICE VISIT (OUTPATIENT)
Dept: UROLOGY | Facility: CLINIC | Age: 47
End: 2022-09-30
Payer: MEDICAID

## 2022-09-30 VITALS
OXYGEN SATURATION: 98 % | HEIGHT: 65 IN | WEIGHT: 104.63 LBS | RESPIRATION RATE: 18 BRPM | SYSTOLIC BLOOD PRESSURE: 90 MMHG | DIASTOLIC BLOOD PRESSURE: 66 MMHG | TEMPERATURE: 98 F | HEART RATE: 90 BPM | BODY MASS INDEX: 17.43 KG/M2

## 2022-09-30 DIAGNOSIS — N31.9 NEUROGENIC BLADDER: Primary | ICD-10-CM

## 2022-09-30 PROCEDURE — 1160F PR REVIEW ALL MEDS BY PRESCRIBER/CLIN PHARMACIST DOCUMENTED: ICD-10-PCS | Mod: CPTII,,, | Performed by: NURSE PRACTITIONER

## 2022-09-30 PROCEDURE — 99213 PR OFFICE/OUTPT VISIT, EST, LEVL III, 20-29 MIN: ICD-10-PCS | Mod: S$PBB,,, | Performed by: NURSE PRACTITIONER

## 2022-09-30 PROCEDURE — 3078F PR MOST RECENT DIASTOLIC BLOOD PRESSURE < 80 MM HG: ICD-10-PCS | Mod: CPTII,,, | Performed by: NURSE PRACTITIONER

## 2022-09-30 PROCEDURE — 3074F PR MOST RECENT SYSTOLIC BLOOD PRESSURE < 130 MM HG: ICD-10-PCS | Mod: CPTII,,, | Performed by: NURSE PRACTITIONER

## 2022-09-30 PROCEDURE — 1159F PR MEDICATION LIST DOCUMENTED IN MEDICAL RECORD: ICD-10-PCS | Mod: CPTII,,, | Performed by: NURSE PRACTITIONER

## 2022-09-30 PROCEDURE — 99213 OFFICE O/P EST LOW 20 MIN: CPT | Mod: S$PBB,,, | Performed by: NURSE PRACTITIONER

## 2022-09-30 PROCEDURE — 1159F MED LIST DOCD IN RCRD: CPT | Mod: CPTII,,, | Performed by: NURSE PRACTITIONER

## 2022-09-30 PROCEDURE — 1160F RVW MEDS BY RX/DR IN RCRD: CPT | Mod: CPTII,,, | Performed by: NURSE PRACTITIONER

## 2022-09-30 PROCEDURE — 3078F DIAST BP <80 MM HG: CPT | Mod: CPTII,,, | Performed by: NURSE PRACTITIONER

## 2022-09-30 PROCEDURE — 3008F BODY MASS INDEX DOCD: CPT | Mod: CPTII,,, | Performed by: NURSE PRACTITIONER

## 2022-09-30 PROCEDURE — 3008F PR BODY MASS INDEX (BMI) DOCUMENTED: ICD-10-PCS | Mod: CPTII,,, | Performed by: NURSE PRACTITIONER

## 2022-09-30 PROCEDURE — 3074F SYST BP LT 130 MM HG: CPT | Mod: CPTII,,, | Performed by: NURSE PRACTITIONER

## 2022-09-30 PROCEDURE — 99214 OFFICE O/P EST MOD 30 MIN: CPT | Mod: PBBFAC | Performed by: NURSE PRACTITIONER
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
## 2022-11-14 ENCOUNTER — TELEPHONE (OUTPATIENT)
Dept: GYNECOLOGY | Facility: CLINIC | Age: 47
End: 2022-11-14

## 2022-11-14 ENCOUNTER — LAB VISIT (OUTPATIENT)
Dept: LAB | Facility: HOSPITAL | Age: 47
End: 2022-11-14
Attending: NURSE PRACTITIONER
Payer: MEDICAID

## 2022-11-14 ENCOUNTER — OFFICE VISIT (OUTPATIENT)
Dept: GYNECOLOGY | Facility: CLINIC | Age: 47
End: 2022-11-14
Payer: MEDICAID

## 2022-11-14 VITALS
SYSTOLIC BLOOD PRESSURE: 128 MMHG | HEART RATE: 96 BPM | HEIGHT: 65 IN | OXYGEN SATURATION: 98 % | TEMPERATURE: 98 F | BODY MASS INDEX: 17.36 KG/M2 | DIASTOLIC BLOOD PRESSURE: 93 MMHG | RESPIRATION RATE: 20 BRPM | WEIGHT: 104.19 LBS

## 2022-11-14 DIAGNOSIS — Z11.3 ROUTINE SCREENING FOR STI (SEXUALLY TRANSMITTED INFECTION): ICD-10-CM

## 2022-11-14 DIAGNOSIS — N76.0 BV (BACTERIAL VAGINOSIS): Primary | ICD-10-CM

## 2022-11-14 DIAGNOSIS — Z01.419 ENCOUNTER FOR ANNUAL ROUTINE GYNECOLOGICAL EXAMINATION: Primary | ICD-10-CM

## 2022-11-14 DIAGNOSIS — Z72.0 TOBACCO USE: ICD-10-CM

## 2022-11-14 DIAGNOSIS — Z12.31 VISIT FOR SCREENING MAMMOGRAM: ICD-10-CM

## 2022-11-14 DIAGNOSIS — B96.89 BV (BACTERIAL VAGINOSIS): Primary | ICD-10-CM

## 2022-11-14 DIAGNOSIS — F33.0 MILD EPISODE OF RECURRENT MAJOR DEPRESSIVE DISORDER: ICD-10-CM

## 2022-11-14 LAB
C TRACH DNA SPEC QL NAA+PROBE: NOT DETECTED
CLUE CELLS VAG QL WET PREP: ABNORMAL
HBV SURFACE AG SERPL QL IA: NONREACTIVE
HCV AB SERPL QL IA: NONREACTIVE
HIV 1+2 AB+HIV1 P24 AG SERPL QL IA: NONREACTIVE
N GONORRHOEA DNA SPEC QL NAA+PROBE: NOT DETECTED
T PALLIDUM AB SER QL: NONREACTIVE
T VAGINALIS VAG QL WET PREP: ABNORMAL
WBC #/AREA VAG WET PREP: ABNORMAL
YEAST SPEC QL WET PREP: ABNORMAL

## 2022-11-14 PROCEDURE — 3008F BODY MASS INDEX DOCD: CPT | Mod: CPTII,,, | Performed by: NURSE PRACTITIONER

## 2022-11-14 PROCEDURE — 86780 TREPONEMA PALLIDUM: CPT

## 2022-11-14 PROCEDURE — 3080F DIAST BP >= 90 MM HG: CPT | Mod: CPTII,,, | Performed by: NURSE PRACTITIONER

## 2022-11-14 PROCEDURE — 99396 PREV VISIT EST AGE 40-64: CPT | Mod: S$PBB,,, | Performed by: NURSE PRACTITIONER

## 2022-11-14 PROCEDURE — 36415 COLL VENOUS BLD VENIPUNCTURE: CPT

## 2022-11-14 PROCEDURE — 87340 HEPATITIS B SURFACE AG IA: CPT

## 2022-11-14 PROCEDURE — 87491 CHLMYD TRACH DNA AMP PROBE: CPT | Performed by: NURSE PRACTITIONER

## 2022-11-14 PROCEDURE — 3008F PR BODY MASS INDEX (BMI) DOCUMENTED: ICD-10-PCS | Mod: CPTII,,, | Performed by: NURSE PRACTITIONER

## 2022-11-14 PROCEDURE — 1159F PR MEDICATION LIST DOCUMENTED IN MEDICAL RECORD: ICD-10-PCS | Mod: CPTII,,, | Performed by: NURSE PRACTITIONER

## 2022-11-14 PROCEDURE — 87591 N.GONORRHOEAE DNA AMP PROB: CPT | Performed by: NURSE PRACTITIONER

## 2022-11-14 PROCEDURE — 3074F SYST BP LT 130 MM HG: CPT | Mod: CPTII,,, | Performed by: NURSE PRACTITIONER

## 2022-11-14 PROCEDURE — 87210 SMEAR WET MOUNT SALINE/INK: CPT | Performed by: NURSE PRACTITIONER

## 2022-11-14 PROCEDURE — 1159F MED LIST DOCD IN RCRD: CPT | Mod: CPTII,,, | Performed by: NURSE PRACTITIONER

## 2022-11-14 PROCEDURE — 86803 HEPATITIS C AB TEST: CPT

## 2022-11-14 PROCEDURE — 3074F PR MOST RECENT SYSTOLIC BLOOD PRESSURE < 130 MM HG: ICD-10-PCS | Mod: CPTII,,, | Performed by: NURSE PRACTITIONER

## 2022-11-14 PROCEDURE — 87389 HIV-1 AG W/HIV-1&-2 AB AG IA: CPT

## 2022-11-14 PROCEDURE — 3080F PR MOST RECENT DIASTOLIC BLOOD PRESSURE >= 90 MM HG: ICD-10-PCS | Mod: CPTII,,, | Performed by: NURSE PRACTITIONER

## 2022-11-14 PROCEDURE — 99396 PR PREVENTIVE VISIT,EST,40-64: ICD-10-PCS | Mod: S$PBB,,, | Performed by: NURSE PRACTITIONER

## 2022-11-14 PROCEDURE — 99215 OFFICE O/P EST HI 40 MIN: CPT | Mod: PBBFAC | Performed by: NURSE PRACTITIONER

## 2022-11-14 RX ORDER — ESCITALOPRAM OXALATE 10 MG/1
5 TABLET ORAL DAILY
Qty: 30 TABLET | Refills: 2 | Status: SHIPPED | OUTPATIENT
Start: 2022-11-14 | End: 2023-02-06 | Stop reason: DRUGHIGH

## 2022-11-14 RX ORDER — METRONIDAZOLE 500 MG/1
500 TABLET ORAL 2 TIMES DAILY
Qty: 14 TABLET | Refills: 0 | Status: SHIPPED | OUTPATIENT
Start: 2022-11-14 | End: 2022-11-21

## 2022-11-14 NOTE — TELEPHONE ENCOUNTER
Swab showed clue cells indicating bacterial vaginosis. Not an STD but an infection in the vaginal area when pH is disrupted. Rx sent for Flagyl. No alcohol during and for 24 hours after treatment. Use unscented soap and no scented products. More showers than baths. No douching.
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
## 2023-02-06 ENCOUNTER — OFFICE VISIT (OUTPATIENT)
Dept: INTERNAL MEDICINE | Facility: CLINIC | Age: 48
End: 2023-02-06
Payer: MEDICAID

## 2023-02-06 VITALS
DIASTOLIC BLOOD PRESSURE: 80 MMHG | WEIGHT: 108.81 LBS | HEIGHT: 66 IN | HEART RATE: 88 BPM | BODY MASS INDEX: 17.49 KG/M2 | TEMPERATURE: 99 F | SYSTOLIC BLOOD PRESSURE: 130 MMHG

## 2023-02-06 DIAGNOSIS — F22 PARANOIA: Chronic | ICD-10-CM

## 2023-02-06 DIAGNOSIS — F33.0 MILD EPISODE OF RECURRENT MAJOR DEPRESSIVE DISORDER: ICD-10-CM

## 2023-02-06 DIAGNOSIS — F41.1 GAD (GENERALIZED ANXIETY DISORDER): Chronic | ICD-10-CM

## 2023-02-06 DIAGNOSIS — F33.3 SEVERE EPISODE OF RECURRENT MAJOR DEPRESSIVE DISORDER, WITH PSYCHOTIC FEATURES: Chronic | ICD-10-CM

## 2023-02-06 DIAGNOSIS — R44.0 AUDITORY HALLUCINATIONS: Chronic | ICD-10-CM

## 2023-02-06 PROCEDURE — 99215 OFFICE O/P EST HI 40 MIN: CPT | Mod: SA,HB,S$PBB, | Performed by: NURSE PRACTITIONER

## 2023-02-06 PROCEDURE — 3079F DIAST BP 80-89 MM HG: CPT | Mod: CPTII,,, | Performed by: NURSE PRACTITIONER

## 2023-02-06 PROCEDURE — 3008F BODY MASS INDEX DOCD: CPT | Mod: CPTII,,, | Performed by: NURSE PRACTITIONER

## 2023-02-06 PROCEDURE — 3075F PR MOST RECENT SYSTOLIC BLOOD PRESS GE 130-139MM HG: ICD-10-PCS | Mod: CPTII,,, | Performed by: NURSE PRACTITIONER

## 2023-02-06 PROCEDURE — 1159F MED LIST DOCD IN RCRD: CPT | Mod: CPTII,,, | Performed by: NURSE PRACTITIONER

## 2023-02-06 PROCEDURE — 1160F RVW MEDS BY RX/DR IN RCRD: CPT | Mod: CPTII,,, | Performed by: NURSE PRACTITIONER

## 2023-02-06 PROCEDURE — 3008F PR BODY MASS INDEX (BMI) DOCUMENTED: ICD-10-PCS | Mod: CPTII,,, | Performed by: NURSE PRACTITIONER

## 2023-02-06 PROCEDURE — 3075F SYST BP GE 130 - 139MM HG: CPT | Mod: CPTII,,, | Performed by: NURSE PRACTITIONER

## 2023-02-06 PROCEDURE — 3079F PR MOST RECENT DIASTOLIC BLOOD PRESSURE 80-89 MM HG: ICD-10-PCS | Mod: CPTII,,, | Performed by: NURSE PRACTITIONER

## 2023-02-06 PROCEDURE — 99215 PR OFFICE/OUTPT VISIT, EST, LEVL V, 40-54 MIN: ICD-10-PCS | Mod: SA,HB,S$PBB, | Performed by: NURSE PRACTITIONER

## 2023-02-06 PROCEDURE — 99214 OFFICE O/P EST MOD 30 MIN: CPT | Mod: PBBFAC | Performed by: NURSE PRACTITIONER

## 2023-02-06 PROCEDURE — 1160F PR REVIEW ALL MEDS BY PRESCRIBER/CLIN PHARMACIST DOCUMENTED: ICD-10-PCS | Mod: CPTII,,, | Performed by: NURSE PRACTITIONER

## 2023-02-06 PROCEDURE — 1159F PR MEDICATION LIST DOCUMENTED IN MEDICAL RECORD: ICD-10-PCS | Mod: CPTII,,, | Performed by: NURSE PRACTITIONER

## 2023-02-06 RX ORDER — ESCITALOPRAM OXALATE 10 MG/1
10 TABLET ORAL DAILY
Qty: 30 TABLET | Refills: 3 | Status: SHIPPED | OUTPATIENT
Start: 2023-02-06 | End: 2024-02-06

## 2023-02-06 RX ORDER — QUETIAPINE FUMARATE 25 MG/1
25 TABLET, FILM COATED ORAL NIGHTLY
Qty: 30 TABLET | Refills: 3 | Status: SHIPPED | OUTPATIENT
Start: 2023-02-06 | End: 2024-02-06
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
## 2023-02-16 DIAGNOSIS — F33.3 SEVERE EPISODE OF RECURRENT MAJOR DEPRESSIVE DISORDER, WITH PSYCHOTIC FEATURES: Chronic | ICD-10-CM

## 2023-02-16 DIAGNOSIS — R44.0 AUDITORY HALLUCINATIONS: Chronic | ICD-10-CM

## 2023-02-16 DIAGNOSIS — F41.1 GAD (GENERALIZED ANXIETY DISORDER): Chronic | ICD-10-CM

## 2023-02-16 DIAGNOSIS — F22 PARANOIA: Chronic | ICD-10-CM

## 2023-02-16 RX ORDER — ESCITALOPRAM OXALATE 10 MG/1
10 TABLET ORAL DAILY
Qty: 30 TABLET | Refills: 3 | Status: CANCELLED | OUTPATIENT
Start: 2023-02-16 | End: 2024-02-16

## 2023-02-16 RX ORDER — QUETIAPINE FUMARATE 25 MG/1
25 TABLET, FILM COATED ORAL NIGHTLY
Qty: 30 TABLET | Refills: 3 | Status: CANCELLED | OUTPATIENT
Start: 2023-02-16 | End: 2024-02-16

## 2023-03-16 ENCOUNTER — HOSPITAL ENCOUNTER (OUTPATIENT)
Dept: RADIOLOGY | Facility: HOSPITAL | Age: 48
Discharge: HOME | End: 2023-03-16
Attending: NURSE PRACTITIONER
Payer: MEDICAID

## 2023-03-16 DIAGNOSIS — R92.8 ABNORMAL MAMMOGRAM: ICD-10-CM

## 2023-03-16 DIAGNOSIS — Z12.31 VISIT FOR SCREENING MAMMOGRAM: ICD-10-CM

## 2023-03-16 PROCEDURE — 25500020 PHARM REV CODE 255

## 2023-03-16 PROCEDURE — 76642 ULTRASOUND BREAST LIMITED: CPT | Mod: TC,LT

## 2023-03-16 PROCEDURE — 77067 MAMMO DIGITAL SCREENING WITH CONTRAST, BILATERAL: ICD-10-PCS | Mod: 26,,, | Performed by: RADIOLOGY

## 2023-03-16 PROCEDURE — 77067 SCR MAMMO BI INCL CAD: CPT | Mod: 26,,, | Performed by: RADIOLOGY

## 2023-03-16 PROCEDURE — 77061 MAMMO DIGITAL DIAGNOSTIC LEFT WITH TOMO: ICD-10-PCS | Mod: 26,GG,LT, | Performed by: RADIOLOGY

## 2023-03-16 PROCEDURE — 77065 DX MAMMO INCL CAD UNI: CPT | Mod: 26,GG,LT, | Performed by: RADIOLOGY

## 2023-03-16 PROCEDURE — 77067 SCR MAMMO BI INCL CAD: CPT | Mod: TC

## 2023-03-16 PROCEDURE — 76642 ULTRASOUND BREAST LIMITED: CPT | Mod: 26,LT,, | Performed by: RADIOLOGY

## 2023-03-16 PROCEDURE — 77061 BREAST TOMOSYNTHESIS UNI: CPT | Mod: TC,59,LT

## 2023-03-16 PROCEDURE — 77061 BREAST TOMOSYNTHESIS UNI: CPT | Mod: 26,GG,LT, | Performed by: RADIOLOGY

## 2023-03-16 PROCEDURE — 77065 MAMMO DIGITAL DIAGNOSTIC LEFT WITH TOMO: ICD-10-PCS | Mod: 26,GG,LT, | Performed by: RADIOLOGY

## 2023-03-16 PROCEDURE — 76642 US BREAST LEFT LIMITED: ICD-10-PCS | Mod: 26,LT,, | Performed by: RADIOLOGY

## 2023-03-16 RX ADMIN — IOHEXOL 100 ML: 350 INJECTION, SOLUTION INTRAVENOUS at 10:03

## 2023-11-08 ENCOUNTER — OFFICE VISIT (OUTPATIENT)
Dept: OTOLARYNGOLOGY | Facility: CLINIC | Age: 48
End: 2023-11-08
Payer: MEDICAID

## 2023-11-08 VITALS
DIASTOLIC BLOOD PRESSURE: 66 MMHG | RESPIRATION RATE: 18 BRPM | TEMPERATURE: 98 F | HEART RATE: 72 BPM | WEIGHT: 107 LBS | SYSTOLIC BLOOD PRESSURE: 103 MMHG | HEIGHT: 66 IN | BODY MASS INDEX: 17.19 KG/M2 | OXYGEN SATURATION: 100 %

## 2023-11-08 DIAGNOSIS — J34.2 NASAL SEPTAL DEVIATION: ICD-10-CM

## 2023-11-08 DIAGNOSIS — J34.89 NASAL OBSTRUCTION: ICD-10-CM

## 2023-11-08 DIAGNOSIS — J34.89 OTHER SPECIFIED DISORDERS OF NOSE AND NASAL SINUSES: Primary | ICD-10-CM

## 2023-11-08 PROCEDURE — 31575 DIAGNOSTIC LARYNGOSCOPY: CPT | Mod: PBBFAC | Performed by: STUDENT IN AN ORGANIZED HEALTH CARE EDUCATION/TRAINING PROGRAM

## 2023-11-08 PROCEDURE — 99214 OFFICE O/P EST MOD 30 MIN: CPT | Mod: PBBFAC | Performed by: STUDENT IN AN ORGANIZED HEALTH CARE EDUCATION/TRAINING PROGRAM

## 2023-11-08 NOTE — PROGRESS NOTES
The scope used for the exam was:  Flexible scope ENF-P4  Serial Number:  1)    [id number]    [x]   2)    [id number]    []   3)    [id number]    []   4)    [id number]    []   5)    [id number]    []   6)    [id number]    []       The scope used for the exam was:  Rigid scope   Serial Number:  1)   [id number]    []   2)   [id number]    []   3)   7330    []   4)    3384   []   5)    0824   []   6)    5554   []     7)   7425    []   8)   2240    []   9)   1109    []
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
## 2023-11-14 ENCOUNTER — HOSPITAL ENCOUNTER (OUTPATIENT)
Dept: RADIOLOGY | Facility: HOSPITAL | Age: 48
Discharge: HOME | End: 2023-11-14
Attending: STUDENT IN AN ORGANIZED HEALTH CARE EDUCATION/TRAINING PROGRAM
Payer: MEDICAID

## 2023-11-14 DIAGNOSIS — J34.2 NASAL SEPTAL DEVIATION: ICD-10-CM

## 2023-11-14 DIAGNOSIS — J34.89 NASAL OBSTRUCTION: ICD-10-CM

## 2023-11-14 DIAGNOSIS — J34.89 OTHER SPECIFIED DISORDERS OF NOSE AND NASAL SINUSES: ICD-10-CM

## 2023-11-14 PROCEDURE — 70486 CT MAXILLOFACIAL W/O DYE: CPT | Mod: TC

## 2023-11-16 ENCOUNTER — TELEPHONE (OUTPATIENT)
Dept: GYNECOLOGY | Facility: CLINIC | Age: 48
End: 2023-11-16

## 2023-11-16 ENCOUNTER — OFFICE VISIT (OUTPATIENT)
Dept: GYNECOLOGY | Facility: CLINIC | Age: 48
End: 2023-11-16
Payer: MEDICAID

## 2023-11-16 VITALS
RESPIRATION RATE: 20 BRPM | SYSTOLIC BLOOD PRESSURE: 115 MMHG | TEMPERATURE: 98 F | BODY MASS INDEX: 17.62 KG/M2 | HEIGHT: 66 IN | DIASTOLIC BLOOD PRESSURE: 75 MMHG | HEART RATE: 63 BPM | WEIGHT: 109.63 LBS | OXYGEN SATURATION: 100 %

## 2023-11-16 DIAGNOSIS — Z12.31 VISIT FOR SCREENING MAMMOGRAM: ICD-10-CM

## 2023-11-16 DIAGNOSIS — Z01.419 ENCOUNTER FOR ANNUAL ROUTINE GYNECOLOGICAL EXAMINATION: Primary | ICD-10-CM

## 2023-11-16 DIAGNOSIS — R33.9 INCOMPLETE BLADDER EMPTYING: ICD-10-CM

## 2023-11-16 DIAGNOSIS — Z11.3 ROUTINE SCREENING FOR STI (SEXUALLY TRANSMITTED INFECTION): ICD-10-CM

## 2023-11-16 LAB
BACTERIA #/AREA URNS AUTO: ABNORMAL /HPF
BILIRUB UR QL STRIP.AUTO: NEGATIVE
BILIRUB UR QL STRIP: NEGATIVE
CLUE CELLS VAG QL WET PREP: ABNORMAL
COLOR UR AUTO: YELLOW
GLUCOSE UR QL STRIP.AUTO: NORMAL
GLUCOSE UR QL STRIP: NEGATIVE
KETONES UR QL STRIP.AUTO: NEGATIVE
KETONES UR QL STRIP: NEGATIVE
LEUKOCYTE ESTERASE UR QL STRIP.AUTO: 500
LEUKOCYTE ESTERASE UR QL STRIP: POSITIVE
NITRITE UR QL STRIP.AUTO: NEGATIVE
PH UR STRIP.AUTO: 6.5 [PH]
PH, POC UA: 7
POC BLOOD, URINE: NEGATIVE
POC NITRATES, URINE: NEGATIVE
PROT UR QL STRIP.AUTO: ABNORMAL
PROT UR QL STRIP: POSITIVE
RBC #/AREA URNS AUTO: ABNORMAL /HPF
RBC UR QL AUTO: ABNORMAL
SP GR UR STRIP.AUTO: 1.01 (ref 1–1.03)
SP GR UR STRIP: 1.02 (ref 1–1.03)
SPERM URNS QL MICRO: ABNORMAL /HPF
SQUAMOUS #/AREA URNS LPF: ABNORMAL /HPF
T VAGINALIS VAG QL WET PREP: ABNORMAL
UROBILINOGEN UR STRIP-ACNC: 0.2 (ref 0.1–1.1)
UROBILINOGEN UR STRIP-ACNC: NORMAL
WBC #/AREA URNS AUTO: ABNORMAL /HPF
WBC #/AREA VAG WET PREP: ABNORMAL
YEAST SPEC QL WET PREP: ABNORMAL

## 2023-11-16 PROCEDURE — 87210 SMEAR WET MOUNT SALINE/INK: CPT | Performed by: NURSE PRACTITIONER

## 2023-11-16 PROCEDURE — 3074F PR MOST RECENT SYSTOLIC BLOOD PRESSURE < 130 MM HG: ICD-10-PCS | Mod: CPTII,,, | Performed by: NURSE PRACTITIONER

## 2023-11-16 PROCEDURE — 1159F PR MEDICATION LIST DOCUMENTED IN MEDICAL RECORD: ICD-10-PCS | Mod: CPTII,,, | Performed by: NURSE PRACTITIONER

## 2023-11-16 PROCEDURE — 3008F BODY MASS INDEX DOCD: CPT | Mod: CPTII,,, | Performed by: NURSE PRACTITIONER

## 2023-11-16 PROCEDURE — 3074F SYST BP LT 130 MM HG: CPT | Mod: CPTII,,, | Performed by: NURSE PRACTITIONER

## 2023-11-16 PROCEDURE — 81003 URINALYSIS AUTO W/O SCOPE: CPT | Mod: PBBFAC | Performed by: NURSE PRACTITIONER

## 2023-11-16 PROCEDURE — 99396 PR PREVENTIVE VISIT,EST,40-64: ICD-10-PCS | Mod: S$PBB,,, | Performed by: NURSE PRACTITIONER

## 2023-11-16 PROCEDURE — 87086 URINE CULTURE/COLONY COUNT: CPT | Performed by: NURSE PRACTITIONER

## 2023-11-16 PROCEDURE — 99396 PREV VISIT EST AGE 40-64: CPT | Mod: S$PBB,,, | Performed by: NURSE PRACTITIONER

## 2023-11-16 PROCEDURE — 99214 OFFICE O/P EST MOD 30 MIN: CPT | Mod: PBBFAC | Performed by: NURSE PRACTITIONER

## 2023-11-16 PROCEDURE — 3078F DIAST BP <80 MM HG: CPT | Mod: CPTII,,, | Performed by: NURSE PRACTITIONER

## 2023-11-16 PROCEDURE — 81001 URINALYSIS AUTO W/SCOPE: CPT | Performed by: NURSE PRACTITIONER

## 2023-11-16 PROCEDURE — 1159F MED LIST DOCD IN RCRD: CPT | Mod: CPTII,,, | Performed by: NURSE PRACTITIONER

## 2023-11-16 PROCEDURE — 3008F PR BODY MASS INDEX (BMI) DOCUMENTED: ICD-10-PCS | Mod: CPTII,,, | Performed by: NURSE PRACTITIONER

## 2023-11-16 PROCEDURE — 3078F PR MOST RECENT DIASTOLIC BLOOD PRESSURE < 80 MM HG: ICD-10-PCS | Mod: CPTII,,, | Performed by: NURSE PRACTITIONER

## 2023-11-16 RX ORDER — OFLOXACIN 3 MG/ML
SOLUTION AURICULAR (OTIC)
COMMUNITY
Start: 2023-10-13

## 2023-11-16 RX ORDER — AMOXICILLIN AND CLAVULANATE POTASSIUM 875; 125 MG/1; MG/1
1 TABLET, FILM COATED ORAL EVERY 12 HOURS
COMMUNITY
Start: 2023-10-11

## 2023-11-16 RX ORDER — DICLOFENAC SODIUM 75 MG/1
75 TABLET, DELAYED RELEASE ORAL 2 TIMES DAILY
COMMUNITY
Start: 2023-10-13

## 2023-11-16 RX ORDER — CIPROFLOXACIN AND DEXAMETHASONE 3; 1 MG/ML; MG/ML
SUSPENSION/ DROPS AURICULAR (OTIC)
COMMUNITY
Start: 2023-10-22

## 2023-11-16 RX ORDER — LEVOFLOXACIN 500 MG/1
500 TABLET, FILM COATED ORAL EVERY MORNING
COMMUNITY
Start: 2023-10-22

## 2023-11-16 RX ORDER — CETIRIZINE HYDROCHLORIDE 10 MG/1
10 TABLET ORAL DAILY
COMMUNITY

## 2023-11-16 NOTE — TELEPHONE ENCOUNTER
Spoke to patient to inform that her urine collection was not enough and that she needed to come back to retest. Patient stated that she will have to call around for a ride. Patient stated she is unsure of when she will be able to come. Patient stated that she will call when she does find a ride. Patient informed that order is in the system.

## 2023-11-16 NOTE — PROGRESS NOTES
Subjective:       Patient ID: Claudia Hurtado is a 48 y.o. female.    Chief Complaint:  Gynecologic Exam      History of Present Illness  Pt is  here for annual GYN exam. Pt reports a hx of breast cancer with lumpectomy and chemo/radiation at age 25. Pt also reports colon cancer with surgery at 28 and 33. Reports skin cancer. Pt states she has had a hysterectomy with BSO in  in [place], d/t ovarian cancer. Last vag pap--NIL and HPV neg. MG-3/16/23-BIRADS 2. Denies any breast complaints. Pt is followed by urology for neurogenic bladder, n/s last visit. Today, pt c/o incomplete bladder emptying, incontinence and stopped taking oxybutynin. Denies pelvic pain. Treated for trichomonas in , she is interested in STD screening today states partner cheated and last time she had these bladder symptoms she had trichomonas. Positive tobacco use. Pt followed by behavioral health, no showed last visit and has stopped medications. Hx of depression, stopped going to [facility] as they were giving her too much medication. Denies SI or HI.  Admits to breast cancer in mother @ 52 and ovarian cancer in 2 sisters. Pt completed genetic testing in 2017 showing only variant of uncertain significance (VUS): HEIKE [id number].     GYN & OB History  No LMP recorded. Patient has had a hysterectomy.       Review of patient's allergies indicates:  No Known Allergies  Past Medical History:   Diagnosis Date    Allergy     Cancer     Unspecified urinary incontinence      OB History    Para Term  AB Living   4 3           SAB IAB Ectopic Multiple Live Births                  # Outcome Date GA Lbr Sukhwinder/2nd Weight Sex Delivery Anes PTL Lv   4             3 Para            2 Para            1 Para                 Review of Systems  Review of Systems    Negative except for pertinent findings for positives per HPI     Objective:    Physical Exam    /75 (BP Location: Right arm, Patient Position: Sitting, BP  "Method: Medium (Automatic))   Pulse 63   Temp 97.9 °F (36.6 °C) (Oral)   Resp 20   Ht 5' 6" (1.676 m)   Wt 49.7 kg (109 lb 9.6 oz)   SpO2 100%   BMI 17.69 kg/m²   GENERAL: Well-developed female in no acute distress.  SKIN: Normal to inspection,warm, dry and intact.  BREASTS: No rashes or erythema. No masses, lumps, discharge, tenderness.  ABDOMEN: Soft, non tender.  VULVA: General appearance WNL; external genitalia with no lesions or erythema.  BLADDER: No tenderness.  BIMANUAL EXAM: Vaginal mucosa/vault pink, Vaginal cuff intact. Uterus/Cervix surgically absent. Jose adnexa reveal no evidence of masses, tenderness.  PSYCHIATRIC: Patient is oriented to person, place, and time. Mood and affect are normal.    Assessment:       1. Encounter for annual routine gynecological examination    2. Visit for screening mammogram    3. Routine screening for STI (sexually transmitted infection)    4. Incomplete bladder emptying       Plan:   Claudia was seen today for gynecologic exam.    Diagnoses and all orders for this visit:    Encounter for annual routine gynecological examination    Visit for screening mammogram  -     Mammo Digital Screening with Contrast, Bilateral; Future    Routine screening for STI (sexually transmitted infection)  -     Chlamydia/GC, PCR  -     Wet Prep, Genital    Incomplete bladder emptying  -     Urinalysis, Reflex to Urine Culture  -     POCT Urinalysis, Dipstick, Automated, W/O Scope    Pelvic today, pap deferred d/t hysterectomy for benign reasons  MG ordered  Wet prep and urine g/c  Urine dip-blood and leukocytes  U/A, can only provide small amount of urine  Pt instructed to schedule f/u with urology  Pt instructed to schedule f/u with behavioral health.  Follow up in about 1 year (around 11/16/2024) for annual exam.    "

## 2023-11-18 LAB — BACTERIA UR CULT: NO GROWTH

## 2023-11-20 ENCOUNTER — TELEPHONE (OUTPATIENT)
Dept: GYNECOLOGY | Facility: CLINIC | Age: 48
End: 2023-11-20
Payer: MEDICAID

## 2023-11-20 DIAGNOSIS — B96.89 BV (BACTERIAL VAGINOSIS): Primary | ICD-10-CM

## 2023-11-20 DIAGNOSIS — N76.0 BV (BACTERIAL VAGINOSIS): Primary | ICD-10-CM

## 2023-11-20 RX ORDER — METRONIDAZOLE 500 MG/1
500 TABLET ORAL 2 TIMES DAILY
Qty: 14 TABLET | Refills: 0 | Status: SHIPPED | OUTPATIENT
Start: 2023-11-20 | End: 2023-11-27

## 2023-11-20 NOTE — TELEPHONE ENCOUNTER
Swab showed clue cells indicating bacterial vaginosis. Not an STD but an infection in the vaginal area when pH is disrupted. Rx sent for Flagyl. No alcohol during and for 48-72 hours after treatment. Use unscented soap and no scented products. More showers than baths. No douching.      Pt can take after completion of Augmentin.     Please inform pt that urine culture indicates no growth, so no UTI. Symptoms likely r/t neurogenic bladder that she follows urology for. Strongly suggest she restarts oxybutynin and calls to reschedule no show appts with urology.    Pt should also schedule f/u with her PCP.

## 2023-11-20 NOTE — PROGRESS NOTES
I reviewed the history and physical exam with the resident.   I agree with findings and plan.    Gennaro Ramirez M.D.

## 2023-12-05 ENCOUNTER — OFFICE VISIT (OUTPATIENT)
Dept: OTOLARYNGOLOGY | Facility: CLINIC | Age: 48
End: 2023-12-05
Payer: MEDICAID

## 2023-12-05 VITALS
SYSTOLIC BLOOD PRESSURE: 115 MMHG | TEMPERATURE: 97 F | BODY MASS INDEX: 17.68 KG/M2 | WEIGHT: 110 LBS | HEART RATE: 86 BPM | RESPIRATION RATE: 22 BRPM | HEIGHT: 66 IN | OXYGEN SATURATION: 99 % | DIASTOLIC BLOOD PRESSURE: 75 MMHG

## 2023-12-05 DIAGNOSIS — J34.89 NASAL OBSTRUCTION: Primary | ICD-10-CM

## 2023-12-05 PROCEDURE — 99214 OFFICE O/P EST MOD 30 MIN: CPT | Mod: PBBFAC | Performed by: STUDENT IN AN ORGANIZED HEALTH CARE EDUCATION/TRAINING PROGRAM
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
## 2024-01-02 ENCOUNTER — OFFICE VISIT (OUTPATIENT)
Dept: OTOLARYNGOLOGY | Facility: CLINIC | Age: 49
End: 2024-01-02
Payer: MEDICAID

## 2024-01-02 VITALS
WEIGHT: 106 LBS | BODY MASS INDEX: 17.11 KG/M2 | SYSTOLIC BLOOD PRESSURE: 126 MMHG | DIASTOLIC BLOOD PRESSURE: 85 MMHG | HEART RATE: 75 BPM | TEMPERATURE: 98 F

## 2024-01-02 DIAGNOSIS — J34.89 OTHER SPECIFIED DISORDERS OF NOSE AND NASAL SINUSES: ICD-10-CM

## 2024-01-02 DIAGNOSIS — J34.2 NASAL SEPTAL DEVIATION: ICD-10-CM

## 2024-01-02 DIAGNOSIS — J34.89 NASAL OBSTRUCTION: Primary | ICD-10-CM

## 2024-01-02 PROCEDURE — 99215 OFFICE O/P EST HI 40 MIN: CPT | Mod: PBBFAC

## 2024-01-02 RX ORDER — LIDOCAINE HYDROCHLORIDE 10 MG/ML
1 INJECTION, SOLUTION EPIDURAL; INFILTRATION; INTRACAUDAL; PERINEURAL ONCE
OUTPATIENT
Start: 2024-01-02 | End: 2024-01-02

## 2024-01-02 RX ORDER — SODIUM CHLORIDE 0.9 % (FLUSH) 0.9 %
10 SYRINGE (ML) INJECTION
Status: SHIPPED | OUTPATIENT
Start: 2024-01-02
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
## 2024-03-19 ENCOUNTER — PATIENT MESSAGE (OUTPATIENT)
Dept: ADMINISTRATIVE | Facility: OTHER | Age: 49
End: 2024-03-19
Payer: MEDICAID

## 2024-03-19 ENCOUNTER — PATIENT MESSAGE (OUTPATIENT)
Dept: PREADMISSION TESTING | Facility: HOSPITAL | Age: 49
End: 2024-03-19
Payer: MEDICAID

## 2024-03-20 ENCOUNTER — PATIENT MESSAGE (OUTPATIENT)
Dept: ADMINISTRATIVE | Facility: OTHER | Age: 49
End: 2024-03-20
Payer: MEDICAID

## 2024-03-21 ENCOUNTER — PATIENT MESSAGE (OUTPATIENT)
Dept: ADMINISTRATIVE | Facility: OTHER | Age: 49
End: 2024-03-21
Payer: MEDICAID

## 2024-03-21 ENCOUNTER — PATIENT MESSAGE (OUTPATIENT)
Dept: SURGERY | Facility: HOSPITAL | Age: 49
End: 2024-03-21
Payer: MEDICAID

## 2024-03-21 ENCOUNTER — HOSPITAL ENCOUNTER (OUTPATIENT)
Dept: RADIOLOGY | Facility: HOSPITAL | Age: 49
Discharge: HOME | End: 2024-03-21
Attending: STUDENT IN AN ORGANIZED HEALTH CARE EDUCATION/TRAINING PROGRAM | Admitting: OTOLARYNGOLOGY
Payer: MEDICAID

## 2024-03-21 PROCEDURE — 71046 X-RAY EXAM CHEST 2 VIEWS: CPT | Mod: TC
